# Patient Record
Sex: MALE | Race: WHITE | Employment: FULL TIME | URBAN - METROPOLITAN AREA
[De-identification: names, ages, dates, MRNs, and addresses within clinical notes are randomized per-mention and may not be internally consistent; named-entity substitution may affect disease eponyms.]

---

## 2020-12-23 ENCOUNTER — OFFICE VISIT (OUTPATIENT)
Dept: ORTHOPEDIC SURGERY | Age: 35
End: 2020-12-23
Payer: COMMERCIAL

## 2020-12-23 ENCOUNTER — TELEPHONE (OUTPATIENT)
Dept: ORTHOPEDIC SURGERY | Age: 35
End: 2020-12-23

## 2020-12-23 VITALS — HEIGHT: 69 IN | WEIGHT: 196 LBS | BODY MASS INDEX: 29.03 KG/M2

## 2020-12-23 PROCEDURE — 99203 OFFICE O/P NEW LOW 30 MIN: CPT | Performed by: ORTHOPAEDIC SURGERY

## 2020-12-23 PROCEDURE — L4361 PNEUMA/VAC WALK BOOT PRE OTS: HCPCS | Performed by: ORTHOPAEDIC SURGERY

## 2020-12-23 NOTE — TELEPHONE ENCOUNTER
12/23/20 [facility]     -  NO PRECERT REQUIRED - LIANG ENAMORADO @ [facility]   REF # [id number]

## 2020-12-23 NOTE — PROGRESS NOTES
[facility]  Outpatient Progress Note  Sanjiv Jenkins MD    Patient Name: Karime Matos MRN: [id number]   Age: 30 y.o. YOB: 1985   Sex: male      [address] Complaint   Patient presents with    Foot Pain     RIGHT HEEL  PAIN FOR SEVERAL WEEKS        HISTORY OF PRESENT ILLNESS   Karime Matos is a 30 y.o. male presents the office today for evaluation of complaints of pain in the posterior aspect of his right heel for the last 4 or 5 weeks. Patient reports having been in an awkward position doing some work on his deck and felt discomfort in his thigh and calf. He has rested and done some stretching exercises and his thigh and calf pain has improved but he tried to do some Achilles tendon stretches and then also did some higher impact exercise and notices that the pain has now centralized to the posterior aspect of the heel. He feels sharp pain now with every step that he takes. He has not been able to get comfortable despite shoewear modification and over-the-counter anti-inflammatory medications. 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 He does note rather significant tightness in both of his calf muscles. PAST MEDICAL HISTORY    History reviewed. No pertinent past medical history. PAST SURGICAL HISTORY   History reviewed. No pertinent surgical history. MEDICATIONS     No current outpatient medications on file. No current facility-administered medications for this visit. ALLERGIES   No Known Allergies    FAMILY HISTORY   History reviewed. No pertinent family history.     SOCIAL HISTORY     Social History     Socioeconomic History    Marital status: None     Spouse name: None    Number of children: None    Years of education: None    Highest education level: None   Occupational History    None   Social Needs    Financial resource strain: None    Food insecurity Worry: None     Inability: None    Transportation needs     Medical: None     Non-medical: None   Tobacco Use    Smoking status: None   Substance and Sexual Activity    Alcohol use: None    Drug use: None    Sexual activity: None   Lifestyle    Physical activity     Days per week: None     Minutes per session: None    Stress: None   Relationships    Social connections     Talks on phone: None     Gets together: None     Attends Zoroastrian service: None     Active member of club or organization: None     Attends meetings of clubs or organizations: None     Relationship status: None    Intimate partner violence     Fear of current or ex partner: None     Emotionally abused: None     Physically abused: None     Forced sexual activity: None   Other Topics Concern    None   Social History Narrative    None       REVIEW OF SYSTEMS   General: no fever, chills, night sweats, anorexia, malaise, fatigue, or weight change  Hematologic:  no unexplained bleeding or bruising  HEENT:   no nasal congestion, rhinorrhea, sore throat, or facial pain  Respiratory:  no cough, dyspnea, or chest pain  Cardiovascular:  no angina, LLOYD, PND, orthopnea, dependent edema, or palpitations  Gastrointestinal:  no nausea, vomiting, diarrhea, constipation, or abdominal pain  Genitourinary:  no urinary urgency, frequency, dysuria, or hematuria  Musculoskeletal: see HPI  Endocrine:  no heat or cold intolerance and no polyphagia, polydipsia, or polyuria  Skin:  no skin eruptions or changing lesions  Neurologic:  no focal weakness, numbness/tingling, tremor, or severe headache. See HPI. See HPI for pertinent positives. PHYSICAL EXAM   Vital Signs:   Vitals:    12/23/20 1319   Weight: 196 lb (88.9 kg)   Height: 5' 9\" (1.753 m)       General appearance: healthy, alert, no distress  Skin: Skin color, texture, turgor normal. No rashes or lesions  HEENT: atraumatic, normocephalic.  PERRL  Respiratory: Unlabored breathing Lymphatic: No adenopathy   Neuro: Alert and oriented, normal distal sensation, normal bilateral DTRs  Vascular: Normal distal capillary and distal pulses  Muskuloskeletal Exam: Right foot examination  There is no swelling erythema warmth ecchymosis or edema. There is no deformity. Weightbearing alignment is normal.  There is exquisite tenderness to palpation in the posterior aspect of the heel at the Achilles tendon insertion. There is moderate Achilles tightness. Gait is antalgic to the right. RADIOLOGY   X-rays obtained and reviewed in office:  Views 2 views of the right calcaneus    Impression: There is a very small posterior calcaneal enthesophyte. No acute abnormality    IMPRESSION     1. Achilles tendinitis, right leg         PLAN   I had a lengthy discussion with patient today regarding diagnosis and treatment options and recommendations. We have discussed that this condition should be able to be successfully treated with nonoperative measures. I have recommended he be immobilized in a removable walking cast boot for comfort and to aid in ambulation. I would recommend he wear the boot for 2 to 4 weeks until pain symptoms resolve. He can use ice, ibuprofen and over-the-counter Voltaren gel to control pain and discomfort. Once he is symptomatically improved we will address the underlying Achilles tightness with a stretching program.  He may benefit from supervised physical therapy. We also discussed proper shoewear modification. FOLLOWUP     No follow-ups on file. Orders Placed This Encounter   Procedures    XR CALCANEUS RIGHT (MIN 2 VIEWS)    Breg Tall Ria Walking Boot     Patient was prescribed a Breg Tall Ria Walking Boot. The right FOOT will require stabilization / immobilization from this semi-rigid / rigid orthosis to improve their function. The orthosis will assist in protecting the affected area, provide functional support and facilitate healing. Patient was instructed to progress ambulation weight bearing as tolerated in the device. The patient was educated and fit by a healthcare professional with expert knowledge and specialization in brace application while under the direct supervision of the physician. Verbal and written instructions for the use of and application of this item were provided. They were instructed to contact the office immediately should the brace result in increased pain, decreased sensation, increased swelling or worsening of the condition. No orders of the defined types were placed in this encounter.       Patient was instructed on appropriate use of braces, participation in home exercise programs, healthy lifestyle choices and weight loss as appropriate     Dilshad Blanco MD

## 2020-12-23 NOTE — PATIENT INSTRUCTIONS
The patient was fitted for a walking cast boot brace in the office today. The patient was instructed on proper care and use of the brace. Patient was instructed to contact the office if there are any questions or difficulties with the use of the brace.

## 2021-01-13 ENCOUNTER — OFFICE VISIT (OUTPATIENT)
Dept: ORTHOPEDIC SURGERY | Age: 36
End: 2021-01-13
Payer: COMMERCIAL

## 2021-01-13 VITALS — HEIGHT: 69 IN | WEIGHT: 196 LBS | BODY MASS INDEX: 29.03 KG/M2

## 2021-01-13 DIAGNOSIS — M76.61 ACHILLES TENDINITIS, RIGHT LEG: Primary | ICD-10-CM

## 2021-01-13 PROCEDURE — 99213 OFFICE O/P EST LOW 20 MIN: CPT | Performed by: ORTHOPAEDIC SURGERY

## 2021-01-13 NOTE — PATIENT INSTRUCTIONS
ACHILLES TENDON STRETCHING PROGRAM       Hello and welcome to the most important stretch that you can do for your lower legs and feet. The achilles tendon is 9 times stronger than any other lower leg tendon. When this tendon gets tight, it causes a cascade of gait changes that can cause injury to other tendons and joints in the foot. Our stretching program is aimed at treating the primary problem, achilles tendon tightness. In turn, the problems that you are experiencing, which are being caused or exacerbated by the tightness, will improve and resolve in time. Stretching your achilles tendon is the best maintenance you can do for your foot. Achilles tendon tightness develops gradually in all of us as time goes by. Some people have a genetic propensity to get tighter more quickly and at a younger age. Others may perform activities or sports that accelerate the tightness. The exact cause is not critical.  What is critical is diagnosing the tightness and treating it. Untreated achilles tendon tightness can lead to a host of problems including:  plantar fasciitis, achilles tendinitis, midfoot arthritis, metatarsalgia, and hammertoes to name just a few. The stretching program outlined below  has been validated hundreds and hundreds of times over the last 17 years of our practice. Try to follow the stretching protocol as closely as you can. It is designed to gradually improve your flexibility safely and comfortably. Some patients may require as little as 3 weeks to see improvement, while others may need upwards of 5-6 months to break through a long standing Achilles contracture. This stretching should be done 3 times a day and divided roughly throughout the day. You should begin the stretch at 15 seconds three times daily and gradually increase your stretching as explained later in the handout. Within 2 months you will be stretching 9 minutes a day.       The stretch can be performed on the edge of a stool or steps, or by using a tilt board or similar device. It is OK for your toes to angle upward; you should not be gripping the step with your toes. If you are in the correct position you should feel a pulling or tightness in your upper calf muscle, just below the knee. Be sure to time your stretching:  time never moves more slowly than when stretching your calves! The majority of patients will experience new pain somewhere between 2-6 weeks after beginning the stretching program.  This is definitely expected and is usually a minor increase in pain and should not be excruciating. If it becomes severe, please contact the office. If you continue the stretching program and work through this pain, it typically begins to resolve within a few weeks. As you progress with the stretching program, your response will have an up-and-down course (you will have some good days and bad days)--this is expected and normal.  While not all patients symptoms are relieved completely, usually a satisfactory result is obtained within three to six months. You have to BE PATIENT. You will not likely see improvement in the first few weeks and may not see any at all until the 3rd month. Eventually your pain will ease. Remember,  this is a maintenance stretch, not a vaccination:  you will have to keep stretching for the rest of your life. Our Stretching Protocol  How long and how often should I stretch my calves?  Find your starting point based on your age, fitness level and health status. (See scale below.) Use the numbers (1-4) on the device as points of reference, with 1 being the least intense, and when the device is turned around, 4 providing the most intense stretch. There is not a set position to start stretching, as all people are at different fitness and flexibility levels. Once you have found a position that provides a good stretch, you can work from there.  The optimal stretching time calf that is too tight, and therefore, are usually not long-lasting. First of all, you must know that the primary cause of the majority of foot and ankle problems is due to our calves that have become too tight as we age. Usually the calf contracture is silent, so you are not aware. The calf muscle and Achilles tendon are a continuous structure on the back of the leg, which in turn causes increased stress on most areas of your foot and ankle when the calf is too tight. As people age, tightness (contracture) is almost inevitable. There are several reasons this occurs such as:  1. Decreased daily activities. As we become more sedentary, we have less daily stretch of the calf muscles. 2. Age-related decrease in the elasticity of the calf connective tissue. 3. Higher heeled shoes will put the calf in a shortened position and it tends to stay there. 4. Athletes/runners calves shorten for reasons outside the scope of this handout. Our treatment is aimed at solving the primary problem of calf contractures with simple static calf stretching. In turn, your problem, which is due to or aggravated by calf contractures, will improve or resolve completely. Simple calf stretching is the treatment of choice and will generally obtain satisfactory to complete relief in better than 95% of the patients. Some patients may require as little as 2 weeks to see improvement while others may need upwards of 5-6 months to break through a long standing calf contracture. In the beginning the amount you stretch will vary due to pain or soreness of the calf muscles, heels, or other problem we are treating. This stretch will be done with both feet, therefore you may experience increased new pain in both heels, but this new pain will resolve as well. This stretching should be done three times a day; building up to three minutes of hang time for each session or set as detailed on the chart above.  For the best consistency do the stretching sessions like sets or in a cluster; do your first stretch session and take a break for 1-2 minutes (e.g., take your shower) then the next stretch session and so on. That way you are done for the day and you are less likely to miss. With your back against the wall and your knees straight, place the arch of your feet on the One Stretch (it is best to wear tennis shoes or rubber soled shoes in order to gain better traction to secure your feet) and slowly relax your ankles, letting your heels go downward. If you are in the correct position you should feel a pulling or tightness in your upper calf muscle, below the knee. It is OK to feel some mild pain. The amount of time you start at is dependent on your age, fitness, and any current health issues (see chart above). For instance, if you are young, fit, and healthy you might start at 3 minutes right away. Of course it is always best to use caution when choosing your starting point. Do the three sessions per day gradually increasing the amount of hang time. At times you may need to stay at the same amount of hang time for a few extra days. As you become accustomed to that particular amount of hang time you will need to increase it gradually in the same manner until you reach the maximum of 3 minutes each session. This is a gradual process and although you may want to get right to three minutes of hang time it may not be advisable to do so as you may cause yourself unnecessary pain. Also, keep the intensity of your stretch reasonable. A light to moderate stretch that you feel in your calf is as good as a hard intense stretch. The length of time you stretch has been found to be much more important than the intensity of the stretch. Be Patient! Dont over do it.   The majority of patients will experience new or a slight increase in pain somewhere between 2-6 weeks after beginning the stretching program. This is definitely expected and is usually only minor increase in pain and should not be excruciating. If it becomes severe, please consult your physician. If you continue the stretching program and work through this pain, it typically begins to resolve within a few weeks. As you progress with the stretching program, your response will have an up-and-down course (you will have some good days and bad days)--this is expected and normal. While not every patients symptoms are relieved completely, usually a satisfactory result is obtained within one to four months. Once you have obtained satisfactory relief, we recommend that the stretching is continued FOREVER.       ACHILLES TENDON STRETCHING PROGRAM      Week I - 15 seconds 3 times/day Week II - -30 seconds 3 times/day  Week III - -1 minute 3 times/day  Week IV - -1 ½ minutes 3 times/day  Week V - -2 minutes 3 times/day  Week VI - -2 ½ minutes 3 times/day  Week VII 3 minutes 3 times/day   for life, never stop                         Then maintenance of 3 minutes 1-2 times/day for life

## 2021-01-13 NOTE — PROGRESS NOTES
[facility]  Outpatient Progress Note  Jaylan Amaya MD    Patient Name: Beto Bull MRN: [id number]   Age: 30 y.o. YOB: 1985   Sex: male      [address] Complaint   Patient presents with    Follow-up     RIGHT HEEL - ACHILLES TENDONITIS. PATIENT STATES NO PAIN TODAY AND READY TO BE OUT OF BOOT       HISTORY OF PRESENT ILLNESS   Beto Bull is a 30 y.o. male   The patient presents for a follow up visit:  He is being treated for insertional Achilles tendinitis on the right and has been wearing a removable walking cast boot for the last 3 weeks and reports his pain symptoms have resolved    PAST MEDICAL HISTORY    History reviewed. No pertinent past medical history. PAST SURGICAL HISTORY   History reviewed. No pertinent surgical history. MEDICATIONS     No current outpatient medications on file. No current facility-administered medications for this visit. ALLERGIES   No Known Allergies    FAMILY HISTORY   History reviewed. No pertinent family history.     SOCIAL HISTORY     Social History     Socioeconomic History    Marital status:      Spouse name: None    Number of children: None    Years of education: None    Highest education level: None   Occupational History    None   Social Needs    Financial resource strain: None    Food insecurity     Worry: None     Inability: None    Transportation needs     Medical: None     Non-medical: None   Tobacco Use    Smoking status: Never Smoker    Smokeless tobacco: Never Used   Substance and Sexual Activity    Alcohol use: None    Drug use: None    Sexual activity: None   Lifestyle    Physical activity     Days per week: None     Minutes per session: None    Stress: None   Relationships    Social connections     Talks on phone: None     Gets together: None     Attends Anglican service: None     Active member of club or organization: None     Attends meetings of clubs or organizations: None     Relationship status: None    Intimate partner violence     Fear of current or ex partner: None     Emotionally abused: None     Physically abused: None     Forced sexual activity: None   Other Topics Concern    None   Social History Narrative    None       REVIEW OF SYSTEMS   General: no fever, chills, night sweats, anorexia, malaise, fatigue, or weight change  Hematologic:  no unexplained bleeding or bruising  HEENT:   no nasal congestion, rhinorrhea, sore throat, or facial pain  Respiratory:  no cough, dyspnea, or chest pain  Cardiovascular:  no angina, LLOYD, PND, orthopnea, dependent edema, or palpitations  Gastrointestinal:  no nausea, vomiting, diarrhea, constipation, or abdominal pain  Genitourinary:  no urinary urgency, frequency, dysuria, or hematuria  Musculoskeletal: see HPI  Endocrine:  no heat or cold intolerance and no polyphagia, polydipsia, or polyuria  Skin:  no skin eruptions or changing lesions  Neurologic:  no focal weakness, numbness/tingling, tremor, or severe headache. See HPI. See HPI for pertinent positives. PHYSICAL EXAM   Vital Signs: Ht 5' 9\" (1.753 m)   Wt 196 lb (88.9 kg)   BMI 28.94 kg/m²     General appearance: healthy, alert, no distress  Skin: Skin color, texture, turgor normal. No rashes or lesions  HEENT: atraumatic, normocephalic. PERRL  Respiratory: Unlabored breathing  Lymphatic: No adenopathy   Neuro: Alert and oriented, normal distal sensation, normal bilateral DTRs  Vascular: Normal distal capillary and distal pulses  Muskuloskeletal Exam:   The right ankle has no swelling erythema warmth ecchymosis or edema. No swelling warmth or tenderness at the Achilles tendon insertion. Ankle range of motion is full with mild Achilles tightness. Gait appears normal      IMPRESSION     1. Achilles tendinitis, right leg           PLAN   The patient is improved after immobilization in the walking cast boot.   I recommend he transition from the boot into an athletic shoe with a slight heel lift. He will begin a Achilles tendon stretching program.  Continue with topical diclofenac and oral ibuprofen or Tylenol as needed. Return in 4 to 6 weeks if he has ongoing difficulties. FOLLOWUP     Return in about 4 weeks (around 2/10/2021), or if symptoms worsen or fail to improve. No orders of the defined types were placed in this encounter. No orders of the defined types were placed in this encounter.       Patient was instructed on appropriate use of braces, participation in home exercise programs, healthy lifestyle choices and weight loss as appropriate     Marin Moya MD